# Patient Record
Sex: FEMALE | URBAN - METROPOLITAN AREA
[De-identification: names, ages, dates, MRNs, and addresses within clinical notes are randomized per-mention and may not be internally consistent; named-entity substitution may affect disease eponyms.]

---

## 2017-08-15 DIAGNOSIS — E06.3 HASHIMOTO'S THYROIDITIS: ICD-10-CM

## 2017-08-15 RX ORDER — LEVOTHYROXINE SODIUM 112 MCG
TABLET ORAL
Qty: 90 TABLET | Refills: 0 | Status: SHIPPED | OUTPATIENT
Start: 2017-08-15 | End: 2017-10-04

## 2017-10-04 ENCOUNTER — OFFICE VISIT (OUTPATIENT)
Dept: ENDOCRINOLOGY | Facility: CLINIC | Age: 57
End: 2017-10-04
Payer: COMMERCIAL

## 2017-10-04 VITALS
BODY MASS INDEX: 22.8 KG/M2 | HEIGHT: 62 IN | WEIGHT: 123.9 LBS | DIASTOLIC BLOOD PRESSURE: 80 MMHG | SYSTOLIC BLOOD PRESSURE: 122 MMHG | HEART RATE: 79 BPM

## 2017-10-04 DIAGNOSIS — E06.3 HASHIMOTO'S THYROIDITIS: ICD-10-CM

## 2017-10-04 LAB
T4 FREE SERPL-MCNC: 1.21 NG/DL (ref 0.76–1.46)
TSH SERPL DL<=0.005 MIU/L-ACNC: 3.02 MU/L (ref 0.4–4)

## 2017-10-04 PROCEDURE — 99213 OFFICE O/P EST LOW 20 MIN: CPT | Performed by: INTERNAL MEDICINE

## 2017-10-04 PROCEDURE — 36415 COLL VENOUS BLD VENIPUNCTURE: CPT | Performed by: INTERNAL MEDICINE

## 2017-10-04 PROCEDURE — 84443 ASSAY THYROID STIM HORMONE: CPT | Performed by: INTERNAL MEDICINE

## 2017-10-04 PROCEDURE — 84439 ASSAY OF FREE THYROXINE: CPT | Performed by: INTERNAL MEDICINE

## 2017-10-04 RX ORDER — LEVOTHYROXINE SODIUM 112 UG/1
112 TABLET ORAL DAILY
Qty: 90 TABLET | Refills: 3 | Status: SHIPPED | OUTPATIENT
Start: 2017-10-04 | End: 2017-10-04

## 2017-10-04 RX ORDER — LEVOTHYROXINE SODIUM 112 MCG
112 TABLET ORAL DAILY
Qty: 90 TABLET | Refills: 3 | Status: SHIPPED | OUTPATIENT
Start: 2017-10-04 | End: 2017-10-24

## 2017-10-04 NOTE — NURSING NOTE
"Jennifer Godoy's goals for this visit include: Follow up Hashimotos  She requests these members of her care team be copied on today's visit information: YES    PCP: Jordy Rosen    Referring Provider:  No referring provider defined for this encounter.    Chief Complaint   Patient presents with     Thyroid Problem       Initial Ht 1.575 m (5' 2\")  Wt 56.2 kg (123 lb 14.4 oz)  BMI 22.66 kg/m2 Estimated body mass index is 22.66 kg/(m^2) as calculated from the following:    Height as of this encounter: 1.575 m (5' 2\").    Weight as of this encounter: 56.2 kg (123 lb 14.4 oz).  Medication Reconciliation: complete    Do you need any medication refills at today's visit? YES    "

## 2017-10-04 NOTE — PROGRESS NOTES
The patient is seen in followup for Hashimoto thyroiditis.    Jennifer Godoy is a 57 year old female diagnosed with Hashimoto's thyroiditis and subclinical hypothyroidism, in September 2013, when she was started on treatment with levothyroxine. The dose was progressively increased to the current dose of 112 g daily.     She continues to feel good and she denies feeling any different since starting levothyroxine.  She has questions regarding hypothyroid symptoms, as one of her friends endorsed weight gain related to hypothyroidism.     In December 2014, she was started on treatment with Vivelle patches by her gynecologist. She remains on Vivelle patches. The hot flashes are controlled.     Past Surgical History   Procedure Date     Appendectomy      Gyn surgery 2010     Hysterectomy; ovaries left in place      Breast surgery 2001     Preventative bilateral Mastectomy     Current Medications  Prescription Medications as of 10/4/2017             SIMVASTATIN PO Take 20 mg by mouth    estradiol (VIVELLE-DOT) 0.05 MG/24HR patch Place 1 patch onto the skin twice a week    Probiotic Product (PROBIOTIC DAILY PO)     SYNTHROID 112 MCG tablet TAKE 1 TABLET DAILY        Family History   Problem Relation Age of Onset     Diabetes type 2  Mother      Breast CA Mother      Heart Mother      MI     Cancer Father      Kidney with mets to brain     Cancer Brother      Back - sarcoma      Diabetes type 2  Brother      Blood Disease Brother      Sepsis     Stroke Brother      Social History  . No children. She smokes for 30 years, 1/2 PPD. She occasionally drinks alcohol. Denies using illicit drugs. Occupation: .     Review of Systems   Systemic:              Weight stable, no fatigue   Eye:                      No eye symptoms   Justo-Laryngeal:     No justo-laryngeal symptoms, no dysphagia, no hoarseness, no cough     Cardiovascular:    No cardiovascular symptoms, no CP or palpitations   Pulmonary:           No  "pulmonary symptoms, no SOB or cough    Gastrointestinal:   No gastrointestinal symptoms, no diarrhea or constipation   Genitourinary:       No genitourinary symptoms, no increased thirst or urination   Endocrine:            no cold or heat intolerance; hot flashes as above   Neurological:        No neurological symptoms, no headaches, no tremor, no numbness or tingling sensation, no dizziness   Musculoskeletal:  No musculoskeletal symptoms, no muscle or joint pain   Skin:                     No skin symptoms, no dry skin, no hair falling out   Psychological:     No psychological symptoms                 Vital Signs     Previous Weights:    Wt Readings from Last 5 Encounters:   10/04/17 56.2 kg (123 lb 14.4 oz)   09/28/16 55.7 kg (122 lb 14.4 oz)   09/29/15 55.8 kg (123 lb)   07/15/14 54.9 kg (121 lb)   09/24/13 55.9 kg (123 lb 4.8 oz)       /80  Pulse 79  Ht 1.575 m (5' 2\")  Wt 56.2 kg (123 lb 14.4 oz)  BMI 22.66 kg/m2    Physical Exam  General Appearance: she is well developed, well nourished and in no distress     Eyes:  conjutivae and extra-ocular motions are normal.                                    pupils round and reactive to light, no lid lag, no stare    HEENT:   oropharynx clear and moist, no JVD, no bruits      no thyromegaly, no palpable nodules   Cardiovascular:  regular rhythm, no murmurs, distal pulse palpable, no edema  Respiratory:        chest clear, no rales, no rhonchi   Musculoskeletal:  normal tone and strength  Psychological:          affect and judgment normal  Skin:  warm, no lesions  Neurological:  reflexes normal and symmetric, no resting tremor.     I reviewed prior lab results documented in EPIC.   TSH   Date Value Ref Range Status   09/28/2016 3.61 0.40 - 4.00 mU/L Final     T4 Free   Date Value Ref Range Status   09/28/2016 1.09 0.76 - 1.46 ng/dL Final     Assessment     Hashimoto's thyroiditis    The patient is, clinically, euthyroid.  We are going to recheck the thyroid " hormone levels today. I reviewed with her the signs and symptoms of hypothyroidism.  Also discussed about long-term complications of treatment with hormone replacement therapy.  Since she had bilateral mastectomy and hysterectomy, I think her risk of developing complications is small, with the exception of smoking, which does, theoretically, increase the risk of clotting.      Orders Placed This Encounter   Procedures     TSH     T4 free     RTC 1 year.

## 2017-10-04 NOTE — MR AVS SNAPSHOT
After Visit Summary   10/4/2017    Jennifer Godoy    MRN: [id number]           Patient Information     Date Of Birth          1960        Visit Information        Provider Department      10/4/2017 4:00 PM Cara Reyna MD [facility]        Today's Diagnoses     Hashimoto's thyroiditis          Care Instructions    Please allow 7-10 business days for your lab results. You will be notified by phone, letter, or My Chart after the provider has reviewed them.  Thank you.             Follow-ups after your visit        Follow-up notes from your care team     Return in about 1 year (around 10/4/2018).      Your next 10 appointments already scheduled     Oct 03, 2018  4:00 PM CDT   Return Visit with Cara Reyna MD   [facility] ([facility]    [address] [place] [id number] [phone]              Who to contact     If you have questions or need follow up information about today's clinic visit or your schedule please contact [facility] directly at [phone].  Normal or non-critical lab and imaging results will be communicated to you by MyChart, letter or phone within 4 business days after the clinic has received the results. If you do not hear from us within 7 days, please contact the clinic through Motosmartyhart or phone. If you have a critical or abnormal lab result, we will notify you by phone as soon as possible.  Submit refill requests through MakuCell or call your pharmacy and they will forward the refill request to us. Please allow 3 business days for your refill to be completed.          Additional Information About Your Visit        Motosmartyhart Information     MakuCell is an electronic gateway that provides easy, online access to your medical records. With MakuCell, you can request a clinic appointment, read your test results, renew a prescription or communicate with your care team.     To  "sign up for MyChart visit the website at [email or web address]   You will be asked to enter the access code listed below, as well as some personal information. Please follow the directions to create your username and password.     Your access code is: [email or web address]  Expires: 2018 11:51 AM     Your access code will  in 90 days. If you need help or a new code, please contact your [facility] or call [phone] for assistance.        Care EveryWhere ID     This is your Care EveryWhere ID. This could be used by other organizations to access your [facility] medical records  [id number]        Your Vitals Were     Pulse Height BMI (Body Mass Index)             79 1.575 m (5' 2\") 22.66 kg/m2          Blood Pressure from Last 3 Encounters:   10/04/17 122/80   16 121/81   09/29/15 117/81    Weight from Last 3 Encounters:   10/04/17 56.2 kg (123 lb 14.4 oz)   16 55.7 kg (122 lb 14.4 oz)   09/29/15 55.8 kg (123 lb)              We Performed the Following     T4 free     TSH          Today's Medication Changes          These changes are accurate as of: 10/4/17 11:59 PM.  If you have any questions, ask your nurse or doctor.               Start taking these medicines.        Dose/Directions    SYNTHROID 112 MCG tablet   Used for:  Hashimoto's thyroiditis   Generic drug:  levothyroxine   Started by:  Cara Reyna MD        Dose:  112 mcg   Take 1 tablet (112 mcg) by mouth daily   Quantity:  90 tablet   Refills:  3            Where to get your medicines      These medications were sent to [facility] - [place], [place] - [address] AT [address] [facility]  [address], [facility] [address]     Phone:  [phone]     SYNTHROID 112 MCG tablet                Primary Care Provider Office Phone # Fax #    Jordy Rosen [phone]       [place] [address] [facility] [address]      "   Equal Access to Services     [facility] : Hadii aad ku hadmary carmenstefan Ginaalec, wajeffda luqbillieha, qazachery narendrasandrachelsi lizarraga. So [facility] [phone].    ATENCIÓN: Si habla español, tiene a uribe disposición servicios gratuitos de asistencia lingüística. Llame al [phone].    We comply with applicable federal civil rights laws and [place] laws. We do not discriminate on the basis of race, color, national origin, age, disability, sex, sexual orientation, or gender identity.            Thank you!     Thank you for choosing [facility]  for your care. Our goal is always to provide you with excellent care. Hearing back from our patients is one way we can continue to improve our services. Please take a few minutes to complete the written survey that you may receive in the mail after your visit with us. Thank you!             Your Updated Medication List - Protect others around you: Learn how to safely use, store and throw away your medicines at www.disposemymeds.org.          This list is accurate as of: 10/4/17 11:59 PM.  Always use your most recent med list.                   Brand Name Dispense Instructions for use Diagnosis    estradiol 0.05 MG/24HR BIW patch    VIVELLE-DOT     Place 1 patch onto the skin twice a week        PROBIOTIC DAILY PO           SIMVASTATIN PO      Take 20 mg by mouth        SYNTHROID 112 MCG tablet   Generic drug:  levothyroxine     90 tablet    Take 1 tablet (112 mcg) by mouth daily    Hashimoto's thyroiditis

## 2017-10-24 ENCOUNTER — TELEPHONE (OUTPATIENT)
Dept: ENDOCRINOLOGY | Facility: CLINIC | Age: 57
End: 2017-10-24

## 2017-10-24 DIAGNOSIS — E06.3 HASHIMOTO'S THYROIDITIS: ICD-10-CM

## 2017-10-24 RX ORDER — LEVOTHYROXINE SODIUM 112 UG/1
112 TABLET ORAL DAILY
Qty: 90 TABLET | Refills: 3 | Status: SHIPPED | OUTPATIENT
Start: 2017-10-24 | End: 2018-02-12

## 2017-10-24 NOTE — TELEPHONE ENCOUNTER
[facility] Call Center    Phone Message    Name of Caller: elham Covington    Phone Number: Cell number on file:    Telephone Information:   Mobile [phone]       Best time to return call: Any    May a detailed message be left on voicemail: yes    Relation to patient: Self    Reason for Call: Medication Question or concern regarding medication   Prescription Clarification: SYNTHROID 112 MCG tablet [34766] (Order [id number])     Name of Medication: SYNTHROID 112 MCG tablet [[id number]] (Order [id number])     Prescribing Provider: Dr. Reyna   Pharmacy: [facility]   What on the order needs clarification? Patient would like a call back to discuss medication that was sent to the pharmacy. She states that the generic one was not sent and that is the one she usually takes. Please advise.           Action Taken: Message routed to:  Adult Clinics: Endocrinology p [id number]

## 2017-10-24 NOTE — TELEPHONE ENCOUNTER
"Writer phoned patient to review. Patient states that [facility] only dispenses brand Synthroid but prescription will cost $60 because script specified MARK.     Writer phoned [facility] to review. Representative verified that prescription does need to be completed as generic with notation \"ok to dispense brand\" in order for patient to have lower co-pay.     Prescription corrected and submitted to mail order.     Patient updated.    Niharika Go LPN  Adult Endocrinology  [facility]        "

## 2018-02-12 DIAGNOSIS — E06.3 HASHIMOTO'S THYROIDITIS: ICD-10-CM

## 2018-02-12 RX ORDER — LEVOTHYROXINE SODIUM 112 UG/1
112 TABLET ORAL DAILY
Qty: 90 TABLET | Refills: 3 | Status: SHIPPED | OUTPATIENT
Start: 2018-02-12 | End: 2018-10-15

## 2018-10-01 ENCOUNTER — DOCUMENTATION ONLY (OUTPATIENT)
Dept: LAB | Facility: CLINIC | Age: 58
End: 2018-10-01

## 2018-10-01 DIAGNOSIS — E06.3 HASHIMOTO'S THYROIDITIS: Primary | ICD-10-CM

## 2018-10-15 DIAGNOSIS — E06.3 HASHIMOTO'S THYROIDITIS: ICD-10-CM

## 2018-10-15 LAB
T4 FREE SERPL-MCNC: 1.22 NG/DL (ref 0.76–1.46)
TSH SERPL DL<=0.005 MIU/L-ACNC: 1.77 MU/L (ref 0.4–4)

## 2018-10-15 PROCEDURE — 84439 ASSAY OF FREE THYROXINE: CPT | Performed by: INTERNAL MEDICINE

## 2018-10-15 PROCEDURE — 84443 ASSAY THYROID STIM HORMONE: CPT | Performed by: INTERNAL MEDICINE

## 2018-10-15 PROCEDURE — 36415 COLL VENOUS BLD VENIPUNCTURE: CPT | Performed by: INTERNAL MEDICINE

## 2018-10-15 NOTE — TELEPHONE ENCOUNTER
Patient not scheduled to be seen until 1/16/19.     Patient will have TFT's checked today.     Niharika Go, DONNA  Clinic Coordinator   Adult Endocrinology and Pediatric Speciality Clinics  [facility]

## 2018-10-24 RX ORDER — LEVOTHYROXINE SODIUM 112 MCG
TABLET ORAL
Qty: 90 TABLET | Refills: 3 | Status: SHIPPED | OUTPATIENT
Start: 2018-10-24 | End: 2019-01-16

## 2018-10-24 NOTE — TELEPHONE ENCOUNTER
Please refer to 10/15/18 result letter.    Component      Latest Ref Rng & Units 10/15/2018   TSH      0.40 - 4.00 mU/L 1.77   T4 Free      0.76 - 1.46 ng/dL 1.22       Will forward to Dr. Cara Reyna to approve refill until follow up appt 1/16/19.    Niharika Go LPN  Clinic Coordinator   Adult Endocrinology and Pediatric Speciality Clinics  [facility]

## 2018-10-25 NOTE — TELEPHONE ENCOUNTER
Patient notified refill completed.    Niharika Go LPN  Clinic Coordinator   Adult Endocrinology and Pediatric Speciality Clinics  [facility]

## 2019-01-16 ENCOUNTER — TELEPHONE (OUTPATIENT)
Dept: ENDOCRINOLOGY | Facility: CLINIC | Age: 59
End: 2019-01-16

## 2019-01-16 ENCOUNTER — OFFICE VISIT (OUTPATIENT)
Dept: ENDOCRINOLOGY | Facility: CLINIC | Age: 59
End: 2019-01-16
Payer: COMMERCIAL

## 2019-01-16 VITALS
BODY MASS INDEX: 22.45 KG/M2 | SYSTOLIC BLOOD PRESSURE: 124 MMHG | HEART RATE: 76 BPM | DIASTOLIC BLOOD PRESSURE: 87 MMHG | OXYGEN SATURATION: 96 % | HEIGHT: 62 IN | WEIGHT: 122 LBS

## 2019-01-16 DIAGNOSIS — F17.210 CONTINUOUS DEPENDENCE ON CIGARETTE SMOKING: Primary | ICD-10-CM

## 2019-01-16 DIAGNOSIS — E06.3 HASHIMOTO'S THYROIDITIS: ICD-10-CM

## 2019-01-16 PROCEDURE — 99214 OFFICE O/P EST MOD 30 MIN: CPT | Performed by: INTERNAL MEDICINE

## 2019-01-16 RX ORDER — LEVOTHYROXINE SODIUM 112 MCG
112 TABLET ORAL DAILY
Qty: 90 TABLET | Refills: 3 | Status: SHIPPED | OUTPATIENT
Start: 2019-01-16 | End: 2020-01-16

## 2019-01-16 RX ORDER — LEVOTHYROXINE SODIUM 112 UG/1
112 TABLET ORAL DAILY
Qty: 90 TABLET | Refills: 3 | Status: SHIPPED | OUTPATIENT
Start: 2019-01-16 | End: 2019-01-16

## 2019-01-16 ASSESSMENT — MIFFLIN-ST. JEOR: SCORE: 1084.26

## 2019-01-16 NOTE — TELEPHONE ENCOUNTER
[facility] Call Center    Phone Message    May a detailed message be left on voicemail: yes    Reason for Call: Patient is requesting a call to discuss if her lab visit is needed today. She said she had labs in October.  Please advise.  Thank you.     Action Taken: Message routed to:  Adult Clinics: Endocrinology p [id number]

## 2019-01-16 NOTE — LETTER
1/16/2019         RE: Jennifer Godoy  [address] [facility] [address]        Dear Colleague,    Thank you for referring your patient, Jennifer Godoy, to the [facility]. Please see a copy of my visit note below.      The patient is seen in followup for Hashimoto thyroiditis.    Jennifer Godoy is a 58 year old female diagnosed with Hashimoto's thyroiditis and subclinical hypothyroidism, in September 2013, when she was started on treatment with levothyroxine. The dose was progressively increased to the current dose of 112 g daily.     Since her last visit here, she has been taking a lower dose of estradiol, since May 2018.  The hot flashes are rare, present only at night.    Past Surgical History   Procedure Date     Appendectomy      Gyn surgery 2010     Hysterectomy; ovaries left in place      Breast surgery 2001     Preventative bilateral Mastectomy     Current Medications    Current Outpatient Medications:      estradiol (CLIMARA) 0.0375 MG/24HR weekly patch, Place 1 patch onto the skin twice a week, Disp: , Rfl:      Probiotic Product (PROBIOTIC DAILY PO), , Disp: , Rfl:      SIMVASTATIN PO, Take 20 mg by mouth, Disp: , Rfl:      SYNTHROID 112 MCG tablet, TAKE 1 TABLET DAILY, Disp: 90 tablet, Rfl: 3    Family History   Problem Relation Age of Onset     Diabetes type 2  Mother      Breast CA Mother      Heart Mother      MI     Cancer Father      Kidney with mets to brain     Cancer Brother      Back - sarcoma      Diabetes type 2  Brother      Blood Disease Brother      Sepsis     Stroke Brother      Social History  . No children. She smokes for >30 years, 1/2 PPD. She occasionally drinks alcohol. Denies using illicit drugs. Occupation: .     Review of Systems   Systemic:              Weight stable, no fatigue   Eye:                      No eye symptoms   Justo-Laryngeal:     No justo-laryngeal symptoms, no dysphagia, no hoarseness, no cough     Cardiovascular:    No  "cardiovascular symptoms, no CP or palpitations   Pulmonary:           No pulmonary symptoms, no SOB or cough    Gastrointestinal:   No gastrointestinal symptoms, no diarrhea or constipation   Genitourinary:       No genitourinary symptoms, no increased thirst or urination   Endocrine:            no cold or heat intolerance; hot flashes as above   Neurological:        No neurological symptoms, no headaches, no tremor, no numbness or tingling sensation, no dizziness   Musculoskeletal:  No musculoskeletal symptoms, no muscle or joint pain   Skin:                     No skin symptoms, no dry skin, no hair falling out   Psychological:     No psychological symptoms                 Vital Signs     Previous Weights:    Wt Readings from Last 5 Encounters:   01/16/19 55.3 kg (122 lb)   10/04/17 56.2 kg (123 lb 14.4 oz)   09/28/16 55.7 kg (122 lb 14.4 oz)   09/29/15 55.8 kg (123 lb)   07/15/14 54.9 kg (121 lb)       /87 (BP Location: Left arm, Patient Position: Sitting, Cuff Size: Adult Regular)   Pulse 76   Ht 1.563 m (5' 1.54\")   Wt 55.3 kg (122 lb)   SpO2 96%   BMI 22.65 kg/m       Physical Exam  General Appearance: she is well developed, well nourished and in no distress     Eyes:  conjutivae and extra-ocular motions are normal.                                    pupils round and reactive to light, no lid lag, no stare    HEENT:   oropharynx clear and moist, no JVD, no bruits      no thyromegaly, no palpable nodules   Cardiovascular:  regular rhythm, no murmurs, distal pulse palpable, no edema  Respiratory:        chest clear, no rales, no rhonchi   Musculoskeletal:  normal tone and strength  Psychological:          affect and judgment normal  Skin:  warm, no lesions  Neurological:  reflexes normal and symmetric, no resting tremor.     I reviewed prior lab results documented in EPIC.   TSH   Date Value Ref Range Status   10/15/2018 1.77 0.40 - 4.00 mU/L Final     T4 Free   Date Value Ref Range Status " "  10/15/2018 1.22 0.76 - 1.46 ng/dL Final     Assessment     1. Hashimoto's thyroiditis    The patient is, clinically and biochemically, euthyroid.  I recommended to continue to take the same dose of Synthroid.  Long-term, she can follow up with her primary care provider.    2.  Smoking dependency    Counseled on the importance of quitting smoking and available options.  The patient agreed to enroll in the \"call it quits\" program.     Orders Placed This Encounter   Procedures     CALL IT QUITS (QUITPLAN) REFERRAL                           Again, thank you for allowing me to participate in the care of your patient.        Sincerely,        Cara Reyna MD    "

## 2019-01-16 NOTE — NURSING NOTE
"Jennifer Godoy's goals for this visit include: follow up thyroid   She requests these members of her care team be copied on today's visit information: Yes    PCP: Robbie Gottlieb    Referring Provider:  Robbie Gottlieb PA-C  [facility]  [address] [place] [address]    /87 (BP Location: Left arm, Patient Position: Sitting, Cuff Size: Adult Regular)   Pulse 76   Ht 1.563 m (5' 1.54\")   Wt 55.3 kg (122 lb)   SpO2 96%   BMI 22.65 kg/m      Do you need any medication refills at today's visit? No    "

## 2019-01-16 NOTE — TELEPHONE ENCOUNTER
Contacted patient to review. Patient notes that she had labs in October and she did not feel she needs labs completed. Patient states that she is comfortable with not having labs prior to appointment and discussing with Dr. Reyna to determine if she needs labs completed. Patient will have labs after office visit if needed.       Wanda Romero RN  Endocrine Care Coordinator  [facility]

## 2019-01-16 NOTE — PROGRESS NOTES
The patient is seen in followup for Hashimoto thyroiditis.    Jennifer Godoy is a 58 year old female diagnosed with Hashimoto's thyroiditis and subclinical hypothyroidism, in September 2013, when she was started on treatment with levothyroxine. The dose was progressively increased to the current dose of 112 g daily.     Since her last visit here, she has been taking a lower dose of estradiol, since May 2018.  The hot flashes are rare, present only at night.    Past Surgical History   Procedure Date     Appendectomy      Gyn surgery 2010     Hysterectomy; ovaries left in place      Breast surgery 2001     Preventative bilateral Mastectomy     Current Medications    Current Outpatient Medications:      estradiol (CLIMARA) 0.0375 MG/24HR weekly patch, Place 1 patch onto the skin twice a week, Disp: , Rfl:      Probiotic Product (PROBIOTIC DAILY PO), , Disp: , Rfl:      SIMVASTATIN PO, Take 20 mg by mouth, Disp: , Rfl:      SYNTHROID 112 MCG tablet, TAKE 1 TABLET DAILY, Disp: 90 tablet, Rfl: 3    Family History   Problem Relation Age of Onset     Diabetes type 2  Mother      Breast CA Mother      Heart Mother      MI     Cancer Father      Kidney with mets to brain     Cancer Brother      Back - sarcoma      Diabetes type 2  Brother      Blood Disease Brother      Sepsis     Stroke Brother      Social History  . No children. She smokes for >30 years, 1/2 PPD. She occasionally drinks alcohol. Denies using illicit drugs. Occupation: .     Review of Systems   Systemic:              Weight stable, no fatigue   Eye:                      No eye symptoms   Justo-Laryngeal:     No justo-laryngeal symptoms, no dysphagia, no hoarseness, no cough     Cardiovascular:    No cardiovascular symptoms, no CP or palpitations   Pulmonary:           No pulmonary symptoms, no SOB or cough    Gastrointestinal:   No gastrointestinal symptoms, no diarrhea or constipation   Genitourinary:       No genitourinary symptoms, no  "increased thirst or urination   Endocrine:            no cold or heat intolerance; hot flashes as above   Neurological:        No neurological symptoms, no headaches, no tremor, no numbness or tingling sensation, no dizziness   Musculoskeletal:  No musculoskeletal symptoms, no muscle or joint pain   Skin:                     No skin symptoms, no dry skin, no hair falling out   Psychological:     No psychological symptoms                 Vital Signs     Previous Weights:    Wt Readings from Last 5 Encounters:   01/16/19 55.3 kg (122 lb)   10/04/17 56.2 kg (123 lb 14.4 oz)   09/28/16 55.7 kg (122 lb 14.4 oz)   09/29/15 55.8 kg (123 lb)   07/15/14 54.9 kg (121 lb)       /87 (BP Location: Left arm, Patient Position: Sitting, Cuff Size: Adult Regular)   Pulse 76   Ht 1.563 m (5' 1.54\")   Wt 55.3 kg (122 lb)   SpO2 96%   BMI 22.65 kg/m      Physical Exam  General Appearance: she is well developed, well nourished and in no distress     Eyes:  conjutivae and extra-ocular motions are normal.                                    pupils round and reactive to light, no lid lag, no stare    HEENT:   oropharynx clear and moist, no JVD, no bruits      no thyromegaly, no palpable nodules   Cardiovascular:  regular rhythm, no murmurs, distal pulse palpable, no edema  Respiratory:        chest clear, no rales, no rhonchi   Musculoskeletal:  normal tone and strength  Psychological:          affect and judgment normal  Skin:  warm, no lesions  Neurological:  reflexes normal and symmetric, no resting tremor.     I reviewed prior lab results documented in EPIC.   TSH   Date Value Ref Range Status   10/15/2018 1.77 0.40 - 4.00 mU/L Final     T4 Free   Date Value Ref Range Status   10/15/2018 1.22 0.76 - 1.46 ng/dL Final     Assessment     1. Hashimoto's thyroiditis    The patient is, clinically and biochemically, euthyroid.  I recommended to continue to take the same dose of Synthroid.  Long-term, she can follow up with her " "primary care provider.    2.  Smoking dependency    Counseled on the importance of quitting smoking and available options.  The patient agreed to enroll in the \"call it quits\" program.     Orders Placed This Encounter   Procedures     CALL IT QUITS (QUITPLAN) REFERRAL                       "

## 2020-05-10 DIAGNOSIS — E06.3 HASHIMOTO'S THYROIDITIS: ICD-10-CM

## 2020-05-13 RX ORDER — LEVOTHYROXINE SODIUM 112 MCG
TABLET ORAL
Qty: 90 TABLET | Refills: 3 | OUTPATIENT
Start: 2020-05-13

## 2024-11-19 ENCOUNTER — LAB REQUISITION (OUTPATIENT)
Dept: LAB | Facility: CLINIC | Age: 64
End: 2024-11-19

## 2024-11-19 DIAGNOSIS — N95.1 MENOPAUSAL AND FEMALE CLIMACTERIC STATES: ICD-10-CM

## 2024-11-19 LAB — HOLD SPECIMEN: NORMAL

## 2024-11-19 PROCEDURE — 84450 TRANSFERASE (AST) (SGOT): CPT

## 2024-11-19 PROCEDURE — 82247 BILIRUBIN TOTAL: CPT

## 2024-11-19 PROCEDURE — 84460 ALANINE AMINO (ALT) (SGPT): CPT

## 2024-11-19 PROCEDURE — 84075 ASSAY ALKALINE PHOSPHATASE: CPT

## 2024-11-19 PROCEDURE — 82248 BILIRUBIN DIRECT: CPT

## 2024-11-20 LAB
ALP SERPL-CCNC: 79 U/L (ref 40–150)
ALT SERPL W P-5'-P-CCNC: 22 U/L (ref 0–50)
AST SERPL W P-5'-P-CCNC: 30 U/L (ref 0–45)
BILIRUB DIRECT SERPL-MCNC: <0.2 MG/DL (ref 0–0.3)
BILIRUB SERPL-MCNC: 0.3 MG/DL

## 2024-12-17 ENCOUNTER — LAB REQUISITION (OUTPATIENT)
Dept: LAB | Facility: CLINIC | Age: 64
End: 2024-12-17

## 2024-12-17 DIAGNOSIS — N95.1 MENOPAUSAL AND FEMALE CLIMACTERIC STATES: ICD-10-CM

## 2024-12-17 LAB — HOLD SPECIMEN: NORMAL

## 2024-12-17 PROCEDURE — 84460 ALANINE AMINO (ALT) (SGPT): CPT

## 2024-12-17 PROCEDURE — 84075 ASSAY ALKALINE PHOSPHATASE: CPT

## 2024-12-17 PROCEDURE — 84450 TRANSFERASE (AST) (SGOT): CPT

## 2024-12-17 PROCEDURE — 82248 BILIRUBIN DIRECT: CPT

## 2024-12-18 LAB
ALP SERPL-CCNC: 97 U/L (ref 40–150)
ALT SERPL W P-5'-P-CCNC: 22 U/L (ref 0–50)
AST SERPL W P-5'-P-CCNC: 27 U/L (ref 0–45)
BILIRUB DIRECT SERPL-MCNC: <0.2 MG/DL (ref 0–0.3)
BILIRUB SERPL-MCNC: 0.4 MG/DL

## 2025-01-14 ENCOUNTER — LAB REQUISITION (OUTPATIENT)
Dept: LAB | Facility: CLINIC | Age: 65
End: 2025-01-14

## 2025-01-14 DIAGNOSIS — N95.1 MENOPAUSAL AND FEMALE CLIMACTERIC STATES: ICD-10-CM

## 2025-01-14 LAB — HOLD SPECIMEN: NORMAL

## 2025-01-14 PROCEDURE — 82248 BILIRUBIN DIRECT: CPT

## 2025-01-14 PROCEDURE — 84075 ASSAY ALKALINE PHOSPHATASE: CPT

## 2025-01-14 PROCEDURE — 84450 TRANSFERASE (AST) (SGOT): CPT

## 2025-01-14 PROCEDURE — 82247 BILIRUBIN TOTAL: CPT

## 2025-01-14 PROCEDURE — 84460 ALANINE AMINO (ALT) (SGPT): CPT

## 2025-01-15 LAB
ALP SERPL-CCNC: 92 U/L (ref 40–150)
ALT SERPL W P-5'-P-CCNC: 20 U/L (ref 0–50)
AST SERPL W P-5'-P-CCNC: 27 U/L (ref 0–45)
BILIRUB DIRECT SERPL-MCNC: <0.2 MG/DL (ref 0–0.3)
BILIRUB SERPL-MCNC: 0.3 MG/DL

## 2025-02-19 ENCOUNTER — LAB REQUISITION (OUTPATIENT)
Dept: LAB | Facility: CLINIC | Age: 65
End: 2025-02-19

## 2025-02-19 DIAGNOSIS — N95.1 MENOPAUSAL AND FEMALE CLIMACTERIC STATES: ICD-10-CM

## 2025-02-19 LAB
ALP SERPL-CCNC: 99 U/L (ref 40–150)
ALT SERPL W P-5'-P-CCNC: 20 U/L (ref 0–50)
AST SERPL W P-5'-P-CCNC: 27 U/L (ref 0–45)
BILIRUB DIRECT SERPL-MCNC: 0.12 MG/DL (ref 0–0.3)
BILIRUB SERPL-MCNC: 0.2 MG/DL
HOLD SPECIMEN: NORMAL
HOLD SPECIMEN: NORMAL

## 2025-02-19 PROCEDURE — 84450 TRANSFERASE (AST) (SGOT): CPT

## 2025-02-19 PROCEDURE — 84075 ASSAY ALKALINE PHOSPHATASE: CPT

## 2025-02-19 PROCEDURE — 84460 ALANINE AMINO (ALT) (SGPT): CPT

## 2025-02-19 PROCEDURE — 82247 BILIRUBIN TOTAL: CPT

## 2025-04-23 ENCOUNTER — LAB REQUISITION (OUTPATIENT)
Dept: LAB | Facility: CLINIC | Age: 65
End: 2025-04-23

## 2025-04-23 DIAGNOSIS — N95.1 MENOPAUSAL AND FEMALE CLIMACTERIC STATES: ICD-10-CM

## 2025-04-23 PROCEDURE — 84075 ASSAY ALKALINE PHOSPHATASE: CPT

## 2025-04-23 PROCEDURE — 84460 ALANINE AMINO (ALT) (SGPT): CPT

## 2025-04-23 PROCEDURE — 84450 TRANSFERASE (AST) (SGOT): CPT

## 2025-04-23 PROCEDURE — 82248 BILIRUBIN DIRECT: CPT

## 2025-04-23 PROCEDURE — 82247 BILIRUBIN TOTAL: CPT

## 2025-04-24 LAB
ALP SERPL-CCNC: 99 U/L (ref 40–150)
ALT SERPL W P-5'-P-CCNC: 20 U/L (ref 0–50)
AST SERPL W P-5'-P-CCNC: 25 U/L (ref 0–45)
BILIRUB DIRECT SERPL-MCNC: 0.11 MG/DL (ref 0–0.3)
BILIRUB SERPL-MCNC: 0.3 MG/DL

## 2025-07-02 ENCOUNTER — LAB REQUISITION (OUTPATIENT)
Dept: LAB | Facility: CLINIC | Age: 65
End: 2025-07-02

## 2025-07-02 DIAGNOSIS — N95.1 MENOPAUSAL AND FEMALE CLIMACTERIC STATES: ICD-10-CM

## 2025-07-02 LAB
ALP SERPL-CCNC: 86 U/L (ref 40–150)
ALT SERPL W P-5'-P-CCNC: 17 U/L (ref 0–50)
AST SERPL W P-5'-P-CCNC: 28 U/L (ref 0–45)
BILIRUB DIRECT SERPL-MCNC: 0.15 MG/DL (ref 0–0.45)
BILIRUB SERPL-MCNC: 0.3 MG/DL

## 2025-07-02 PROCEDURE — 84075 ASSAY ALKALINE PHOSPHATASE: CPT

## 2025-07-02 PROCEDURE — 84450 TRANSFERASE (AST) (SGOT): CPT

## 2025-07-02 PROCEDURE — 82248 BILIRUBIN DIRECT: CPT

## 2025-07-02 PROCEDURE — 84460 ALANINE AMINO (ALT) (SGPT): CPT

## 2025-08-27 ENCOUNTER — LAB REQUISITION (OUTPATIENT)
Dept: LAB | Facility: CLINIC | Age: 65
End: 2025-08-27

## 2025-08-27 DIAGNOSIS — N95.1 MENOPAUSAL AND FEMALE CLIMACTERIC STATES: ICD-10-CM

## 2025-08-27 LAB
ALP SERPL-CCNC: 97 U/L (ref 40–150)
ALT SERPL W P-5'-P-CCNC: 20 U/L (ref 0–50)
AST SERPL W P-5'-P-CCNC: 30 U/L (ref 0–45)
BILIRUB DIRECT SERPL-MCNC: 0.12 MG/DL (ref 0–0.45)
BILIRUB SERPL-MCNC: 0.2 MG/DL

## 2025-08-27 PROCEDURE — 84075 ASSAY ALKALINE PHOSPHATASE: CPT

## 2025-08-27 PROCEDURE — 84450 TRANSFERASE (AST) (SGOT): CPT

## 2025-08-27 PROCEDURE — 82248 BILIRUBIN DIRECT: CPT

## 2025-08-27 PROCEDURE — 84460 ALANINE AMINO (ALT) (SGPT): CPT
